# Patient Record
Sex: MALE | Race: WHITE | Employment: FULL TIME | URBAN - METROPOLITAN AREA
[De-identification: names, ages, dates, MRNs, and addresses within clinical notes are randomized per-mention and may not be internally consistent; named-entity substitution may affect disease eponyms.]

---

## 2018-03-10 PROBLEM — S72.21XA FRACTURE, SUBTROCHANTERIC, RIGHT FEMUR, CLOSED, INITIAL ENCOUNTER (HCC): Status: ACTIVE | Noted: 2018-03-10

## 2018-03-10 PROBLEM — S72.009A HIP FRACTURE REQUIRING OPERATIVE REPAIR (HCC): Status: ACTIVE | Noted: 2018-03-10

## 2020-12-26 ENCOUNTER — HOSPITAL ENCOUNTER (EMERGENCY)
Age: 30
Discharge: HOME | End: 2020-12-26
Attending: EMERGENCY MEDICINE

## 2020-12-26 VITALS
RESPIRATION RATE: 18 BRPM | DIASTOLIC BLOOD PRESSURE: 88 MMHG | HEART RATE: 83 BPM | OXYGEN SATURATION: 100 % | TEMPERATURE: 97.6 F | SYSTOLIC BLOOD PRESSURE: 142 MMHG

## 2020-12-26 DIAGNOSIS — L50.9 URTICARIA: Primary | ICD-10-CM

## 2020-12-26 PROCEDURE — 99281 EMR DPT VST MAYX REQ PHY/QHP: CPT

## 2020-12-26 RX ORDER — DIPHENHYDRAMINE HCL 25 MG
CAPSULE ORAL
Qty: 16 CAP | Refills: 0 | Status: SHIPPED | OUTPATIENT
Start: 2020-12-26

## 2020-12-26 RX ORDER — PREDNISONE 50 MG/1
50 TABLET ORAL DAILY
Qty: 5 TAB | Refills: 0 | Status: SHIPPED | OUTPATIENT
Start: 2020-12-26 | End: 2020-12-31

## 2020-12-26 NOTE — Clinical Note
[address] [facility] EMERGENCY DEPT 
[address] 
[facility] [id number] [phone] Work/School Note Date: 12/26/2020 To Whom It May concern: 
 
Freddy Lan was seen and treated today in the emergency room by the following provider(s): 
Attending Provider: Nataliia Chapin MD.   
 
Freddy Lan is excused from work/school on 12/26/20 and 12/27/20. He is medically clear to return to work/school on 12/28/2020. Sincerely, Jenene Homans, MD

## 2020-12-26 NOTE — ED PROVIDER NOTES
EMERGENCY DEPARTMENT HISTORY AND PHYSICAL EXAM    9:12 AM      Date: 12/26/2020  Patient Name: Chelly Cain    History of Presenting Illness     Chief Complaint   Patient presents with    Rash         History Provided By: patient    Additional History (Context): Chelly Cain is a 35 y.o. male presents with rash itching and hives over arms face trunk and legs for 2 weeks unknown exposure but does note he started a new body wash about  3 weeks ago. He works in [employer] and uses a lot of cleaning chemicals. No new detergents new clothing. Does not take medications. No known allergies. Except seasonal allergies. He is taken some Benadryl for this and used can calamine lotion. Cynthia Lee PCP: None    Chief Complaint:   Duration:    Timing:    Location:   Quality:   Severity:   Modifying Factors:   Associated Symptoms:       Current Outpatient Medications   Medication Sig Dispense Refill    diphenhydrAMINE (BenadryL) 25 mg capsule Take 1-2 tabs every 6 hours as needed. 16 Cap 0    predniSONE (DELTASONE) 50 mg tablet Take 1 Tab by mouth daily for 5 days. 5 Tab 0       Past History     Past Medical History:  Past Medical History:   Diagnosis Date    Asthma        Past Surgical History:  Past Surgical History:   Procedure Laterality Date    HX ORTHOPAEDIC      hx of cast right lower leg - didn't have surgery just set and cast    HX OTHER SURGICAL      eye muscle surgery as child; nasal surgery 2004or 2005       Family History:  History reviewed. No pertinent family history. Social History:  Social History     Tobacco Use    Smoking status: Current Every Day Smoker     Packs/day: 1.00    Smokeless tobacco: Never Used   Substance Use Topics    Alcohol use: Yes    Drug use: No       Allergies:  No Known Allergies      Review of Systems     Review of Systems   Constitutional: Negative for diaphoresis and fever. HENT: Negative for congestion and sore throat. Eyes: Negative for pain and itching. Respiratory: Negative for cough and shortness of breath. Cardiovascular: Negative for chest pain and palpitations. Gastrointestinal: Negative for abdominal pain and diarrhea. Endocrine: Negative for polydipsia and polyuria. Genitourinary: Negative for dysuria and hematuria. Musculoskeletal: Negative for arthralgias and myalgias. Skin: Positive for rash. Negative for wound. Neurological: Negative for seizures and syncope. Hematological: Does not bruise/bleed easily. Psychiatric/Behavioral: Negative for agitation and hallucinations. Physical Exam       Patient Vitals for the past 12 hrs:   Temp Pulse Resp BP SpO2   12/26/20 0859 97.6 °F (36.4 °C) 83 18 (!) 142/88 100 %       Physical Exam  Vitals signs and nursing note reviewed. Constitutional:       Appearance: He is well-developed. HENT:      Head: Normocephalic and atraumatic. Eyes:      General: No scleral icterus. Conjunctiva/sclera: Conjunctivae normal.   Neck:      Musculoskeletal: Normal range of motion and neck supple. Vascular: No JVD. Cardiovascular:      Rate and Rhythm: Normal rate and regular rhythm. Pulmonary:      Effort: Pulmonary effort is normal. No respiratory distress. Musculoskeletal: Normal range of motion. Skin:     General: Skin is warm and dry. Comments: Rash with crusting and excoriations over bilateral hands that he states is chronic, its characteristic of eczema and spares the palms. Over his trunk he has saskia hives, as well as bilateral upper arms with secondary excoriations. Neurological:      Mental Status: He is alert. Psychiatric:         Thought Content: Thought content normal.         Judgment: Judgment normal.           Diagnostic Study Results   Labs -  No results found for this or any previous visit (from the past 12 hour(s)). Radiologic Studies -   No orders to display     No results found.     Medications ordered:   Medications - No data to display      Medical Decision Making   Initial Medical Decision Making and DDx:  Very suggestive of allergic urticaria, will treat with Benadryl prednisone. 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 Establish primary care. Not suggestive of Ferne Makenzie syndrome or cellulitis. ED Course: Progress Notes, Reevaluation, and Consults:         I am the first provider for this patient. I reviewed the vital signs, available nursing notes, past medical history, past surgical history, family history and social history. Patient Vitals for the past 12 hrs:   Temp Pulse Resp BP SpO2   12/26/20 0859 97.6 °F (36.4 °C) 83 18 (!) 142/88 100 %       Vital Signs-Reviewed the patient's vital signs. Pulse Oximetry Analysis, Cardiac Monitor, 12 lead ekg:      Interpreted by the EP. Records Reviewed: Nursing notes reviewed (Time of Review: 9:12 AM)    Procedures:   Critical Care Time:   Aspirin: (was aspirin given for stroke?)    Diagnosis     Clinical Impression:   1. Urticaria        Disposition: Discharged      Follow-up Information     Follow up With Specialties Details Why Contact Info    [address] [place] [address]  In 1 week  [address] [place] [address]  [phone]           Patient's Medications   Start Taking    DIPHENHYDRAMINE (BENADRYL) 25 MG CAPSULE    Take 1-2 tabs every 6 hours as needed. PREDNISONE (DELTASONE) 50 MG TABLET    Take 1 Tab by mouth daily for 5 days. Continue Taking    No medications on file   These Medications have changed    No medications on file   Stop Taking    HYDROCODONE-ACETAMINOPHEN (NORCO) 5-325 MG PER TABLET    Take 1-2 Tabs by mouth every four (4) hours as needed. Max Daily Amount: 12 Tabs. IBUPROFEN (MOTRIN) 800 MG TABLET    Take 1 Tab by mouth every eight (8) hours as needed for Pain (take with food).  Indications: Pain _______________________________    Notes:    Radha Bame, MD using Dragon dictation      _______________________________